# Patient Record
Sex: FEMALE | Race: BLACK OR AFRICAN AMERICAN
[De-identification: names, ages, dates, MRNs, and addresses within clinical notes are randomized per-mention and may not be internally consistent; named-entity substitution may affect disease eponyms.]

---

## 2017-04-05 ENCOUNTER — HOSPITAL ENCOUNTER (OUTPATIENT)
Dept: HOSPITAL 74 - JASU-SURG | Age: 57
Discharge: HOME | End: 2017-04-05
Attending: OBSTETRICS & GYNECOLOGY
Payer: COMMERCIAL

## 2017-04-05 VITALS — DIASTOLIC BLOOD PRESSURE: 80 MMHG | SYSTOLIC BLOOD PRESSURE: 143 MMHG | HEART RATE: 83 BPM

## 2017-04-05 VITALS — BODY MASS INDEX: 34.9 KG/M2

## 2017-04-05 VITALS — TEMPERATURE: 97.7 F

## 2017-04-05 DIAGNOSIS — N95.0: Primary | ICD-10-CM

## 2017-04-05 DIAGNOSIS — N84.0: ICD-10-CM

## 2017-04-05 PROCEDURE — 0UB98ZX EXCISION OF UTERUS, VIA NATURAL OR ARTIFICIAL OPENING ENDOSCOPIC, DIAGNOSTIC: ICD-10-PCS | Performed by: OBSTETRICS & GYNECOLOGY

## 2017-04-05 PROCEDURE — 0UDB8ZX EXTRACTION OF ENDOMETRIUM, VIA NATURAL OR ARTIFICIAL OPENING ENDOSCOPIC, DIAGNOSTIC: ICD-10-PCS | Performed by: OBSTETRICS & GYNECOLOGY

## 2017-04-05 NOTE — OP
Operative Note





- Note:


Operative Date: 04/05/17


Pre-Operative Diagnosis: Postmenopausal bleeding


Operation: D&C, hysteroscopy, polypectomy


Findings: 


Small, AV uterus, small endometrial polyps.


Post-Operative Diagnosis: Same as Pre-op


Surgeon: Boni Diggs


Anesthesiologist/CRNA: Lisa Jensen MD


Anesthesia: General


Specimens Removed: Endometrial curettings, uterine polyps


Estimated Blood Loss (mls): 5


Blood Volume Replaced (mls): 0


Fluid Volume Replaced (mls): 300


Operative Report Dictated: Yes

## 2017-04-05 NOTE — HP
Past Medical History





- Primary Care Physician


PCP:: Boni Diggs





- Admission


Chief Complaint: 57yo  female with postmenopausal bleeding.


History of Present Illness: 


h/o endometrial ablation. Pt had an episode of PMB and normal EMB by Dr. Cornelius. The US showed thickened endometrial echo complex and uterine myomas.


History Source: Patient, Medical Record


Limitations to Obtaining History: No Limitations





- Past Medical History


CNS: No: Alzheimer's, CVA, Dementia, Migraine, Multiple Sclerosis, Peripheral 

Neuropathy, Parkinson's, Seizure, Syncope, TIA, Vertigo, Other


Cardiovascular: Yes: CAD (Stent x 5), HTN, Other (hyperlipidemia)


Pulmonary: No: Asthma, Bronchitis, Cancer, COPD, O2 Dependent, Pneumonia, 

Previously Intubated, Pulmonary Embolus, Pulmonary Fibrosis, Sleep Apnea, Other


Gastrointestinal: No: Ascites, Cancer, Constipation, Crohn's Disease, 

Diverticulitis, Diverticulosis, Esophageal Varices, Gastritis, GERD, GI Bleed, 

Hemorrhoids, Hiatal Hernia, Inflamatory Bowel Disease, Irritable Bowel Disease, 

Pancreatitis, Peptic Ulcer Disease, Ulcerative Colitis, Other


Hepatobiliary: No: Cirrhosis, Cholelithiasis, Cholecystitis, Choledocholithiasis

, Hepatitis A, Hepatitis B, Hepatitis C, Other


Renal/: No: Renal Failure, Renal Inusuff, BPH, Cancer, Hematuria, Hemodialysis

, Neurogenic Bladder, Renal Calculi, UTI, Other


Heme/Onc: No: Anemia, B12 Deficiency, Bleeding Disorder, Cancer, Current 

Chemotherapy, Current Radiation Therapy, Hemochromatosis, Hypercoaguable State, 

Myeloproliferative Synd, Sickle Cell Disease, Sickle Cell Trait, 

Thrombocytopenia, Other


Infectious Disease: No: AIDS, C-Diff, Herpes Zoster, HIV, MRSA, STD's, 

Tuberculosis, VREF, Other


Psych: No: Addictions, Anxiety, Bipolar, Depression, Panic, Psychosis, 

Schizophrenia, Other


Rheumatology: Yes: Lupus


Endocrine: Yes: Diabetes Mellitus (NIDDM), Other (goiter)


Additional Medical History: SMOKER





- Past Surgical History


Hx Myomectomy: No


Hx Transabdominal Cerclage: No


Additional Surgical History: Endometrial ablation, cardiac stents, right breast 

surgery, right foot surgery





- Smoking History


Smoking history: Current every day smoker


Have you smoked in the past 12 months: Yes


Aproximately how many cigarettes per day: 10





- Alcohol/Substance Use


Hx Alcohol Use: No





- Social History


Usual Living Arrangement: Yes: Alone


ADL: Independent


Occupation: 


History of Recent Travel: No





Home Medications





- Allergies


Allergies/Adverse Reactions: 


 Allergies











Allergy/AdvReac Type Severity Reaction Status Date / Time


 


Penicillins Allergy  "I THROW Verified 04/04/17 15:32





   UP"  














- Home Medications


Home Medications: 


Ambulatory Orders





Amlodipine Besylate [Norvasc -] 10 mg PO DAILY 04/04/17 


Aspirin Coated [Ecotrin -] 81 mg PO DAILY 04/04/17 


Atorvastatin Ca [Lipitor] 40 mg PO HS 04/04/17 


Clopidogrel Bisulfate [Plavix -] 75 mg PO DAILY 04/04/17 


Metformin HCl 1,000 mg PO BID 04/04/17 











Family Disease History





- Family Disease History


Family Disease History: CA: Mother





Review of Systems





- Review of Systems


Constitutional: reports: No Symptoms


Eyes: reports: No Symptoms


HENT: reports: No Symptoms


Neck: reports: No Symptoms


Cardiovascular: reports: No Symptoms


Respiratory: reports: No Symptoms


Gastrointestinal: reports: No Symptoms


Genitourinary: reports: No Symptoms


Breasts: reports: No Symptoms Reported


Musculoskeletal: reports: No Symptoms


Integumentary: reports: No Symptoms


Neurological: reports: No Symptoms


Endocrine: reports: No Symptoms


Hematology/Lymphatic: reports: No Symptoms


Psychiatric: reports: No Symptoms


Pain Intensity: 0





Physical Exam-GYN


Vital Signs: 


 Vital Signs











Temperature  98.0 F   04/05/17 12:58


 


Pulse Rate  96 H  04/05/17 12:58


 


Respiratory Rate  18   04/05/17 12:58


 


Blood Pressure  155/95   04/05/17 12:58


 


O2 Sat by Pulse Oximetry (%)  98   04/05/17 12:58











Constitutional: Yes: Well Nourished, No Distress, Calm


Eyes: Yes: WNL, Conjunctiva Clear, EOM Intact


HENT: Yes: WNL, Atraumatic, Normocephalic


Neck: Yes: WNL, Supple, Trachea Midline


Cardiovascular: Yes: WNL, Regular Rate and Rhythm


Respiratory: Yes: WNL, Regular, CTA Bilaterally


Gastrointestinal: Yes: WNL, Normal Bowel Sounds, Soft, Abdomen, Obese


Renal/: Yes: WNL


Pelvis: Yes: WNL


External Genitalia: Yes: Normal


Vaginal Exam: Yes: Normal


Cervix: Yes: Normal


Uterus: Yes: Normal, Freely Moveable


Adnexa: Normal: Left, Right


Musculoskeletal: Yes: WNL


Extremities: Yes: WNL


Edema: No


Integumentary: Yes: WNL


Neurological: Yes: WNL, Alert, Oriented


...Motor Strength: WNL


Psychiatric: Yes: WNL, Alert, Oriented





Imaging





- Results


Ultrasound: Report Reviewed





Assessment/Plan


55 yo female with postmenopausal bleeding, thickened endometrial echo, fibroids

, prior endometrial ablation. Pt is admitted for hysteroscopy, D&C. We had a 

long discussion about the risks, benefits, alternatives of surgery. We 

discussed increased risks due to prior ablation, diabetes, obesity. I explained 

the risks of infection, perforation, bleeding, pain, injury to surrounding 

organs and/or structures, etc. The pt verbalized her understanding and 

requested to proceed.

## 2017-04-06 NOTE — OP
DATE OF OPERATION:  04/05/2017 

 

PREOPERATIVE DIAGNOSIS:  Postmenopausal bleeding.

 

POSTOPERATIVE DIAGNOSIS:  Postmenopausal bleeding and uterine polyps.

 

PROCEDURE:  Hysteroscopy, dilatation and curettage, polypectomy.  

 

SURGEON:  Boni Diggs MD 

 

ANESTHESIOLOGIST:  Lisa Jensen MD

 

ANESTHESIA:  General.

 

COMPLICATIONS:  None.

 

PATHOLOGY:  Endometrial curettings and uterine polyps.

 

INTRAVENOUS FLUIDS:  300 mL of crystalloid

 

ESTIMATED BLOOD LOSS:  5 mL.

 

FINDINGS:  Examination under anesthesia revealed a small anteverted uterus with no

pelvic or adnexal masses.  Hysteroscopy revealed a small uterine cavity with several

endometrial polyps.  Diffuse uterine/endometrial scarring was noted, consistent with

patient's previous history of endometrial ablation.  Post-procedure hysteroscopy

confirmed no retained uterine polyps.  

 

DESCRIPTION OF PROCEDURE:  The patient was met preoperatively.  Risks, benefits, and

alternatives of surgery were discussed in detail.  All questions were answered.  The

patient was then brought to the or with IV running.  The patient was placed on the

surgical table in the supine position.  The general anesthesia was achieved without

difficulty.  The patient was then placed in a dorsal lithotomy position using

adjustable Carter stirrups.  The patient was examined under anesthesia with the

findings as described above.  The patient was then prepped and draped in the usual

sterile fashion.  A sterile speculum was introduced inside the vagina, with good

visualization of the cervix.  The anterior cervical lip was grasped with a

single-tooth tenaculum.  The cervical os was dilated to accommodate size 23 Mendoza

dilator.  A hysteroscope was introduced inside the uterine cavity with the findings

as described previously.  The hysteroscope was then removed and uterine polyps were

excised.  A sharp curettage was then performed throughout the endometrial cavity. 

The tissue was submitted to pathology for evaluation.  The hysteroscope was then once

again introduced into the uterine cavity, and no polyps were noted.  Good hemostasis

was confirmed.  The instruments were then removed from the patient.  Once again good

hemostasis was confirmed.  Sponge, lap, and instrument counts were correct.  The

patient was returned to supine position.  The patient was then transferred to

recovery room awake and in stable condition.  

 

 

BIRD THOMPSON/[id number]

DD: 04/05/2017 16:11

DT: 04/06/2017 15:52

Job #:  [id number]

## 2018-06-12 ENCOUNTER — MEDICATION RENEWAL (OUTPATIENT)
Age: 58
End: 2018-06-12

## 2018-06-12 RX ORDER — ISOSORBIDE MONONITRATE 30 MG/1
30 TABLET, EXTENDED RELEASE ORAL
Qty: 45 | Refills: 3 | Status: ACTIVE | COMMUNITY
Start: 2018-06-12 | End: 1900-01-01

## 2018-06-13 ENCOUNTER — APPOINTMENT (OUTPATIENT)
Dept: HEART AND VASCULAR | Facility: CLINIC | Age: 58
End: 2018-06-13
Payer: COMMERCIAL

## 2018-06-13 VITALS — OXYGEN SATURATION: 100 % | SYSTOLIC BLOOD PRESSURE: 159 MMHG | HEART RATE: 93 BPM | DIASTOLIC BLOOD PRESSURE: 97 MMHG

## 2018-06-13 PROCEDURE — 93000 ELECTROCARDIOGRAM COMPLETE: CPT

## 2018-06-13 PROCEDURE — 99214 OFFICE O/P EST MOD 30 MIN: CPT | Mod: 25

## 2018-06-13 RX ORDER — METOPROLOL SUCCINATE 25 MG/1
25 TABLET, EXTENDED RELEASE ORAL DAILY
Qty: 90 | Refills: 1 | Status: ACTIVE | COMMUNITY
Start: 2018-06-13 | End: 1900-01-01

## 2018-06-13 RX ORDER — METFORMIN HYDROCHLORIDE 500 MG/1
500 TABLET, COATED ORAL
Qty: 360 | Refills: 0 | Status: DISCONTINUED | COMMUNITY
Start: 2018-02-09

## 2018-06-13 RX ORDER — IBUPROFEN AND FAMOTIDINE 800; 26.6 MG/1; MG/1
800-26.6 TABLET, COATED ORAL
Qty: 30 | Refills: 0 | Status: DISCONTINUED | COMMUNITY
Start: 2017-12-21

## 2018-06-13 RX ORDER — ATORVASTATIN CALCIUM 40 MG/1
40 TABLET, FILM COATED ORAL
Qty: 90 | Refills: 0 | Status: ACTIVE | COMMUNITY
Start: 2018-03-27

## 2022-06-06 ENCOUNTER — APPOINTMENT (OUTPATIENT)
Dept: HEART AND VASCULAR | Facility: CLINIC | Age: 62
End: 2022-06-06

## 2022-06-06 PROCEDURE — XXXXX: CPT

## 2023-10-18 ENCOUNTER — APPOINTMENT (OUTPATIENT)
Dept: HEART AND VASCULAR | Facility: CLINIC | Age: 63
End: 2023-10-18
Payer: COMMERCIAL

## 2023-10-18 PROCEDURE — 99214 OFFICE O/P EST MOD 30 MIN: CPT

## 2023-12-27 ENCOUNTER — APPOINTMENT (OUTPATIENT)
Dept: HEART AND VASCULAR | Facility: CLINIC | Age: 63
End: 2023-12-27
Payer: COMMERCIAL

## 2023-12-27 PROCEDURE — 99214 OFFICE O/P EST MOD 30 MIN: CPT

## 2024-01-02 NOTE — HISTORY OF PRESENT ILLNESS
[FreeTextEntry1] : 64 y/o F with PMHx of CAD (multiple prior PCI in 2015/2016, LAD, RCA, RPDA / RPL), HTN, HLD, DM2 here for follow up  Caths reviewed from 2015 and 2016 at [facility]: Patent stents in LAD, mid/distal RCA, RPDA  NST 6/27/2023: Normal perfusion  TTE 5/14/2022: Normal EF, no valvular heart disease  Patient denies CP, SOB, LE edema, palpitations, PND/Orthopnea on visit today Has stable good ET > 4 mets without symptoms

## 2024-01-02 NOTE — DISCUSSION/SUMMARY
[FreeTextEntry1] : 62 y/o F with PMHx of CAD (multiple prior PCI in 2015/2016, LAD, RCA, RPDA / RPL), HTN, HLD, DM2  # CAD Continue ASA, Plavix - can hold 5 days prior to surgery Optimal medical therapy and risk factors; Lipitor 40mg daily, CCB, BB Normal NST 6/23  # HLD Continue Lipitor 40mg daily  # HTN Continue Norvasc 10mg daily, Toprol XL 25, Altace 10mg daily  # Pre-Op Clearance RCRI 1, no exertional anginal symptoms, remote stents no further cardiac work up needed No contraindications to surgery Continue ASA, BB perioperatively